# Patient Record
(demographics unavailable — no encounter records)

---

## 2025-03-14 NOTE — PHYSICAL EXAM
[Healthy Appearing] : healthy appearing [Well Nourished] : well nourished [Interactive] : interactive [Normal Appearance] : normal appearance [Well formed] : well formed [Normally Set] : normally set [Normal S1 and S2] : normal S1 and S2 [Murmur] : no murmurs [Clear to Ausculation Bilaterally] : clear to auscultation bilaterally [Abdomen Tenderness] : non-tender [Abdomen Soft] : soft [] : no hepatosplenomegaly [3] : was Ted stage 3 [___] : [unfilled] [Normal] : normal  [de-identified] : deferred as per request

## 2025-03-14 NOTE — PHYSICAL EXAM
[Healthy Appearing] : healthy appearing [Well Nourished] : well nourished [Interactive] : interactive [Normal Appearance] : normal appearance [Well formed] : well formed [Normally Set] : normally set [Normal S1 and S2] : normal S1 and S2 [Murmur] : no murmurs [Clear to Ausculation Bilaterally] : clear to auscultation bilaterally [Abdomen Soft] : soft [Abdomen Tenderness] : non-tender [] : no hepatosplenomegaly [3] : was Tde stage 3 [___] : [unfilled] [Normal] : normal  [de-identified] : deferred as per request

## 2025-03-14 NOTE — HISTORY OF PRESENT ILLNESS
[Headaches] : no headaches [Visual Symptoms] : no ~T visual symptoms [Polyuria] : no polyuria [Polydipsia] : no polydipsia [Knee Pain] : no knee pain [Hip Pain] : no hip pain [Constipation] : no constipation [Sweating] : no sweating [Palpitations] : no palpitations [Nervousness] : no nervousness [Fatigue] : no fatigue [Weakness] : no weakness [Anorexia] : no anorexia [Abdominal Pain] : no abdominal pain [Weight Loss] : no weight loss [Nausea] : no nausea [Vomiting] : no vomiting [FreeTextEntry2] : Eliseo is an 14 year  10 month  old male here for follow up for growth deceleration.  He was originally seen in July 2019 for growth deceleration.  Review of his growth chart indicated a deceleration in height from around the 20th centile to the third centile.  There was also a weight deceleration from the 20th to the seventh.  It appeared as if he had negligible growth over the preceding year although at her height in clinic was somewhat improved.  Screening blood work was normal and bone age was delayed at 7-8 at chronologic age almost 9.  In December 2019 he underwent a GH stimulation test after his follow up in November 2019 when he was growing at 4.09 cm/year. He had a borderline pass at 10.3 ng/dl..  Growth hormone had been applied for after he was growing at 3.22 cm/year at the time of the 7/20 visit. This was not approved by insurance. At the time of his 12/20 visit Eliseo was growing at an improved rate of 6.02 cm/year and the decision was made to observe his growth   In June 2021 Eliseo was found to be again growing at a suboptimal rate of 4.59 cm/year. Height prediction utilizing a bone age of 10 was 64.4 inches which is below expected for family background. As he had had a borderline pass on his first growth hormone stimulation test, the test was repeated. He again achieved a passing result of 12.3 ng/mL. .  Bone age performed as between 10.5 when chronological 12.5 years old end of October 2022. Dad states Eliseo needed larger size Eliseo returned in 6/23.  At that  time I was happy to note that Eliseo's BMI has improved, he was  growing at 5.25 cm/year which although may be slightly low for age, was  clearly appropriate for his early pubertal status.    Eliseo was seen in 1/24..  He had been eating well.   I was happy to note that he was growing at an improved rate of 7.33 cm/year this interval.  Height remains steady on the fourth centile with BMI on the 49th, bone age 12 6/12. at [place] 13 years 8 month Ht pred slightly more than 67" Eliseo returned 7/24  today,.  I was very pleased to note that he was  growing at an excellent rate this interval of 9.39 cm/year.  This was consistent with a pubertal growth spurt.  Of note, the family did not want pubertal staging performed and had also opted to defer it at the time of the previous visit in January 2024 Eliseo returns today, he has been well and has not needed to see Dr. Taylor, he has needed bigger clothing and bigger shoes.  Dad feels that at this time his height is bothering him and the family would like to consider intervention